# Patient Record
(demographics unavailable — no encounter records)

---

## 2025-07-01 NOTE — HISTORY OF PRESENT ILLNESS
[FreeTextEntry1] : Mac Burkett M.D. Sports and Spine Medicine Department of Physical Medicine and Rehabilitation [facility] [address] [facility], 11th [place] [address]   [facility] [address] [place] [address], 4th [place] [address]   For [place] Appointments Phone: [phone] Fax: [phone]   ----------------------------------------------------------------------------------------------------------------------------------------   PATIENT: AMILCAR OLIVIA MRN: [id number] YOB: 1973 DATE OF SERVICE: 07/01/2025 Jul 01, 2025    Dear Drs.   Thank you for referring AMILCAR OLIVIA to my Sports and Spine practice and office. Enclosed is a copy of the patient's consultation/progress note, which includes my complete assessment and recent studies completed during the patient's evaluation. If you have questions or have any patients who require nonsurgical, non-opiate management of any sports, spine, or musculoskeletal conditions, please do not hesitate to contact my  at [phone]. I look forward to taking care of your patients along with you.   Sincerely,   Mac Burkett MD Sports, Spine, & Regenerative Musculoskeletal Medicine [facility]                                                     Initial Consultation:   CC: radiating arm pain   HPI:  This is the first visit to [facility].     AMILCAR OLIVIA presents with the chief complaint as above.   Initial Hx on 07/01/2025 : Presents in person to [facility] The patients difficulties began one month ago while doing the dishes, lifting a cast iron pan, internally rotated and abduction movements immediately unable to range the left non dominant shoulder/arm not significantly improved over the past month, marked difficulties with upper and lower body dressing, toileting and bathing is quite difficult, relies on her right  started treatment 6/4/2025 with Urgent Care, started on cyclobenzaprine 10mg, prednisone pack, and naprosyn PRN has not started the prednisone; has been taking cyclobenzaprine and NSAID 1-2 times per day patient went to PCP 6/2/2025, started on conservative treatments including HEP; condition worsened so she went to Urgent Care The pain is graded as 10/10 left anterior shoulder pain into crossing the elbow, into the wrist The pain is described as sharp The pain is constantly  The pain radiates in the LEFT UPPER limbs in a C5, C6 distribution The patient feels that the pain is overall persistent Patient denies other recent fall, MVA, injury, trauma, or accident besides presenting history above   Aggravating: sleeping, worse at night, any movement at the left shoulder Alleviating: rest, activity modification, pharmacologic treatments as per intake and as above    Meds: denies regular PO pain medications Therapy Program: no recent structured targeted therapy program HEP: doing HEP regularly; HEP consists of greater than four weeks, 5-6 days per week, 10-15 minutes per session Injection Hx: denies locally directed treatment to the area in question Imaging Hx: reviewed    Assoc Sx: Reports intermittent numbness, tingling paresthesia in the UPPER***LOWER limbs in a *** distribution Otherwise denies numbness, Tingling Denies Focal motor weakness in the upper or lower limbs Denies New or worsened bowel or bladder incontinence Denies Saddle anesthesia Denies Using Orthotic(s)/Supportive devices Denies Swelling in the upper/lower extremities They also deny frequent tripping, falling   ROS: A 14 point review of systems was completed. Positive findings are pain as described above. The remaining systems negative.   Breast Cancer Surveillance: up to date COVID HX: reviewed    Assoc Hx: Ambulates without assistive device Level of functioning: indep with ambulation, indep with ADLs Living Situation: dwelling with steps to enter

## 2025-07-01 NOTE — PHYSICAL EXAM
[FreeTextEntry1] : Gen: A+O x 3 in NAD Psych: Normal mood and affect. Responds appropriately to commands Eyes: Anicteric. No discharge. EOMI. Resp: Breathing unlabored CV: radial pulses 2+ and equal. No varicosities noted Ext: No c/c/e Skin: No lesions noted   Gait: +antalgic +  reciprocating heel to toe able to stand on toes and heels WITH hand holding/holding onto counter with both hands Tandem gait intact WITH hand holding Romberg negative   CN2-12 grossly intact   Inspection: Spine alignment is midline, with no evidence of scoliosis. Iliac crest heights and PSIS heights level.   + Forward head position.   Palpation: There is + tenderness over the upper traps, levator scaps, rhomboids, articular pillars, cervical paraspinals, B/L There is + tenderness middle traps, latissimus dorsi, serratus anterior, B/L   Cervical ROM: Flexion, extension, side-bending, rotation, limited in most planes with pain at terminal ROM Finger to nose bilaterally intact    C5 (Shoulder Abduction)        C5 (Elbow Flex)         C6 (Wrist Ext) Right 5/5                                 5/5                           5/5      Left 2/5                                 2/5                           3/5            C7 (Elbow Ext)            C7 (Wrist Flex)             C8 (Long Finger Flex)          T1 (Finger Abduct)         Right 5/5                     5/5                                      5/5                                       5/5                                                Left 3/5                     2/5                                      2/5                                       2/5                                 C8 (Thumb Ext)            C8 & T1 (Thumb Opp)          Right 5/5                            5/5                                                Left 2/5                            1/5                               Tone: Normal. No cog wheeling. Proprioception at DIPs intact B/L Sensation: Grossly intact to light touch and pinprick bilateral upper extremities. Reflexes: 3+ LEFT biceps, pronators, brachioradialis, triceps Hoffmans Sign LEFT Spurling's Sign negative Shoulder Abduction Test (Bakody's) absent Lhermittes Sign negative   RIGHT SHOULDER: neg effusion neg scars or lacerations or lesions neg increased warmth neg scapular winging neg scapular dyskinesis (Type 2) neg muscle atrophy   Palpation: no TTP over sterna-clavicular joint no TTP over clavicle no TTP over coracoid process no TTP over sternum no TTP over AC joint no TTP over humerus no TTP over the spine of the scapula no TTP over the medial border of the scapula, superior angle, inferior angle, lateral border no TTP over supraspinatus no TTP over infraspinatus no TTP over upper trapezius no TTP over deltoid muscle no TTP in the axilla neg crepitus with PROM shoulder   AROM: Active range of motion full and painless Scapulohumeral rhythm was smooth, appropriate  PROM consistent with above    neg sulcus sign neg Neer test neg Castillo test neg Yocums test neg Speeds test neg Yergasons test neg drop arm test neg empty can test neg Odell's Test neg external rotation lag sign neg lift off sign neg belly press neg hornblowers sign neg Horizontal adduction test   Sensation to light touch intact over all dermatomes about the shoulder   Distal pulses present     LEFT SHOULDER neg effusion neg scars or lacerations or lesions neg increased warmth neg scapular winging neg scapular dyskinesis (Type 2) neg muscle atroph   Palpation: no TTP over sterna-clavicular joint no TTP over clavicle no TTP over coracoid process no TTP over sternum + TTP over AC joint n+o TTP over humerus no TTP over the spine of the scapula no TTP over the medial border of the scapula, superior angle, inferior angle, lateral border +TTP over supraspinatus +TTP over infraspinatus no TTP over upper trapezius + TTP over deltoid muscle no TTP in the axilla neg crepitus with PROM shoulder   AROM: Active range of motion limited in most planes as below Scapulohumeral rhythm abnormal: + slowed, interrupted, aided by compensatory movements contralaterally as below   Abduction 45/170-180 Forward Flexion 35/160-180 Elevation through the plane of the scapula 25/170-180 External Rotation 40/80-90 Internal Rotation 20/ Extension 5/50-60 Adduction 15/50-75   PROM consistent with above   neg sulcus sign neg Neer test neg Castillo test neg Yocums test neg Speeds test neg Yergasons test +drop arm test +empty can test neg Odell's Test neg external rotation lag sign neg lift off sign neg belly press neg hornblowers sign neg Horizontal adduction test   Sensation to light touch intact over all dermatomes about the shoulder Distal pulses present   Manual Muscle Testing   Resisted Internal Rotation Left 2/5   Resisted External Rotation  Left 2/5

## 2025-07-01 NOTE — ASSESSMENT
[FreeTextEntry1] :                                                       Assessment/Plan:   AMILCAR OLIVIA is a 51 year female with radicular left upper limb pain here for initial consultation. - Tiers of treatment and management of above diagnosis(es) were discussed with patient - Optimal diet, weight, sleep, and lifestyle management to minimize stress and maximize well being counseling provided - Imaging reviewed and discussed with patient - Patient was advised to HOLD OFF a structured, targeted therapy program 2-3x/wk for 8 wks with goal toward HEP - Patient was advised to start gabapentin for their neuropathic pain symptoms. Patient was instructed to begin with 100mg at bedtime for the next week. If well tolerated, patient will double their nightly dose to 200mg at bedtime. . Patient provided with written instructions as well. All questions were answered and the patient displayed a clear understanding of the plan of care, including titration of gabapentin. The patient was informed about not taking this medication at the same time as any sleeping or muscle relaxant pills. Pt was also notified to stop, and contact our office, if it is too sedating, ankle swelling occurs and/or they experience suicidal thinking. - Patient was advised to apply cool compresses or warm heat to affected regions PRN - Radiographs of cervical spine and left shoulder ordered 07/01/2025   - Jul 01, 2025 Patient was prescribed medrol dose pack (x1) with written instructions, all questions answered, informed of side effects of the medication.  Possible side effects, including hyperglycemia, GI upset, and GI bleed, reviewed with patient. In agreement with patient that potential pain reduction and anti-inflammatory benefits currently outweigh known side effect profile for oral corticosteroids. Patient instructed to immediately stop medication should she develop any abdominal pain, nausea, vomiting, bloody stools, or BRBPR   - Educated about red flag symptoms including (but not limited to) new, worsened, or persistent: fever greater than 100F, bowel or bladder incontinence, bowel obstipation, inability to void urine, urinary leakage, Severe nausea or vomiting, Worsening numbness, worsening tingling/paresthesias, and/or new or progressive motor weakness; advised to seek immediate medical attention at his nearest Emergency department should they experience any of the above   - 07/01/2025: stat MRI left shoulder without contrast is indicated given that the pt has not improved with tylenol, ibuprofen, naproxen, meloxicam, they obtained non-diagnostic radiographs Jul 01, 2025 marked left shoulder ROM limitation in all planes , clinical exam suggests labral derangement, xr from Jul 01, 2025 with marked superior position of the humeral head and moderate GH OA, r/o subluxation  and physical therapy/home exercise program>6 weeks. Patient's imaging is medically necessary to outline targets for locally (interventional) directed treatments and/or guide surgical management.  - 07/01/2025 MRI cervical spine without contrast is indicated given that the pt has not improved with tylenol, ibuprofen, naproxen, meloxicam, they underwent non-diagnostic radiographic imaging of the region Jul 01, 2025, 6/1/2025 onset radicular let non dominant upper limb pain marked functional decline since then despite PCP and Urgent Care treatments including medications, non dx XR Jul 01, 2025, on exam with global left hand weakness and brisk reflexes BUE, and physical therapy/home exercise program>6 weeks. Patient's imaging is medically necessary to outline targets for locally (interventional) directed treatments and/or guide surgical management.    - Follow up in 2-3 weeks after imaging, in person in 2 months to assess their progress   I have personally spent a total of at least 60 minutes preparing, reviewing internal and external records, explaining, counseling, providing necessary information via documented paperwork for this encounter, and coordinating care for this patient encounter.    Thank you, Dr(s), for allowing me to participate in the care of your patient. Please do not hesitate to contact me with questions/concerns.   Mac Burkett M.D. Sports and Spine Department of Physical Medicine and Rehabilitation [facility] [address], [address] [place] [address]   Appointments: [phone] Fax: [phone]

## 2025-07-01 NOTE — DATA REVIEWED
[FreeTextEntry1] : 07/01/2025 Cervical Spine XR [6 views, AP, Lateral, Left/right Obliques, Flex, Ext]: Indication: Pain  Technique: 6 view/s of the Cervical spine. Findings: The Cervical vertebrae are visualized from C1-C7. No acute fracture is identified. The vertebral body and disc space heights are generally maintained. Multilevel facet degeneration noted at C3/4, C4/5 which also demonstrate prominent anterior osteophyte formation. The vertebral bodies maintain normal alignment without spondylolisthesis including during Flexion-Extension views. left sided lateral bridging, osteophytes.  The Odontoid process is intact. There is no pre-vertebral soft tissue swelling. There is loss of cervical lordosis. Impression: MIld cervical DDD   07/01/2025 X-rays of the left (AP, ER, IR, Scapular-Y View) demonstrate:  + joint space narrowing Type 2 acromion neg Fracture or dislocation There IS superior migration of the humeral head There is narrowing within the SASD space with both ER and IR  There is + humeral head pseudocystic appearance (possible repetitive stress from tendinopathic rotator cuff) There is mild degenerative disease of the Acromio-clavicular joint  marked superior position of the humeral head and moderate GH OA, r/o subluxation

## 2025-07-24 NOTE — PHYSICAL EXAM
[FreeTextEntry1] : Gen: A+O x 3 in NAD Psych: Normal mood and affect. Responds appropriately to commands Eyes: Anicteric. No discharge. EOMI. Resp: Breathing unlabored CV: radial pulses 2+ and equal. No varicosities noted Ext: No c/c/e Skin: No lesions noted   Gait: +antalgic +  reciprocating heel to toe able to stand on toes and heels WITH hand holding/holding onto counter with both hands Tandem gait intact WITH hand holding Romberg negative   CN2-12 grossly intact   Inspection: Spine alignment is midline, with no evidence of scoliosis. Iliac crest heights and PSIS heights level.   + Forward head position.   Palpation: There is + tenderness over the upper traps, levator scaps, rhomboids, articular pillars, cervical paraspinals, B/L There is + tenderness middle traps, latissimus dorsi, serratus anterior, B/L   Cervical ROM: Flexion, extension, side-bending, rotation, limited in most planes with pain at terminal ROM Finger to nose bilaterally intact    C5 (Shoulder Abduction)        C5 (Elbow Flex)         C6 (Wrist Ext) Right 5/5                                 5/5                           5/5      Left 2/5                                 2/5                           3/5            C7 (Elbow Ext)            C7 (Wrist Flex)             C8 (Long Finger Flex)          T1 (Finger Abduct)         Right 5/5                     5/5                                      5/5                                       5/5                                                Left 3/5                     2/5                                      2/5                                       2/5                                 C8 (Thumb Ext)            C8 & T1 (Thumb Opp)          Right 5/5                            5/5                                                Left 2/5                            1/5                               Tone: Normal. No cog wheeling. Proprioception at DIPs intact B/L Sensation: Grossly intact to light touch and pinprick bilateral upper extremities. Reflexes: 3+ LEFT biceps, pronators, brachioradialis, triceps Hoffmans Sign LEFT Spurling's Sign negative Shoulder Abduction Test (Bakody's) absent Lhermittes Sign negative   RIGHT SHOULDER: neg effusion neg scars or lacerations or lesions neg increased warmth neg scapular winging neg scapular dyskinesis (Type 2) neg muscle atrophy   Palpation: no TTP over sterna-clavicular joint no TTP over clavicle no TTP over coracoid process no TTP over sternum no TTP over AC joint no TTP over humerus no TTP over the spine of the scapula no TTP over the medial border of the scapula, superior angle, inferior angle, lateral border no TTP over supraspinatus no TTP over infraspinatus no TTP over upper trapezius no TTP over deltoid muscle no TTP in the axilla neg crepitus with PROM shoulder   AROM: Active range of motion full and painless Scapulohumeral rhythm was smooth, appropriate  PROM consistent with above    neg sulcus sign neg Neer test neg Castillo test neg Yocums test neg Speeds test neg Yergasons test neg drop arm test neg empty can test neg [place]'s Test neg external rotation lag sign neg lift off sign neg belly press neg hornblowers sign neg Horizontal adduction test   Sensation to light touch intact over all dermatomes about the shoulder   Distal pulses present     LEFT SHOULDER neg effusion neg scars or lacerations or lesions neg increased warmth neg scapular winging neg scapular dyskinesis (Type 2) neg muscle atroph   Palpation: no TTP over sterna-clavicular joint no TTP over clavicle no TTP over coracoid process no TTP over sternum + TTP over AC joint n+o TTP over humerus no TTP over the spine of the scapula no TTP over the medial border of the scapula, superior angle, inferior angle, lateral border +TTP over supraspinatus +TTP over infraspinatus no TTP over upper trapezius + TTP over deltoid muscle no TTP in the axilla neg crepitus with PROM shoulder   AROM: Active range of motion limited in most planes as below Scapulohumeral rhythm abnormal: + slowed, interrupted, aided by compensatory movements contralaterally as below   Abduction 45/170-180 Forward Flexion 35/160-180 Elevation through the plane of the scapula 25/170-180 External Rotation 40/80-90 Internal Rotation 20/ Extension 5/50-60 Adduction 15/50-75   PROM consistent with above   neg sulcus sign neg Neer test neg Castillo test neg Yocums test neg Speeds test neg Yergasons test +drop arm test +empty can test neg [place]'s Test neg external rotation lag sign neg lift off sign neg belly press neg hornblowers sign neg Horizontal adduction test   Sensation to light touch intact over all dermatomes about the shoulder Distal pulses present   Manual Muscle Testing   Resisted Internal Rotation Left 2/5   Resisted External Rotation  Left 2/5

## 2025-07-24 NOTE — ASSESSMENT
[FreeTextEntry1] :                                                       Assessment/Plan:   AMILCAR OLIVIA is a 51 year female with radicular left upper limb pain here for follow up - Tiers of treatment and management of above diagnosis(es) were discussed with patient - Optimal diet, weight, sleep, and lifestyle management to minimize stress and maximize well being counseling provided - Imaging reviewed and discussed with patient - Patient was advised to HOLD OFF a structured, targeted therapy program 2-3x/wk for 8 wks with goal toward HEP - Patient was advised to start gabapentin for their neuropathic pain symptoms. Patient was instructed to begin with 100mg at bedtime for the next week. If well tolerated, patient will double their nightly dose to 200mg at bedtime. . Patient provided with written instructions as well. All questions were answered and the patient displayed a clear understanding of the plan of care, including titration of gabapentin. The patient was informed about not taking this medication at the same time as any sleeping or muscle relaxant pills. Pt was also notified to stop, and contact our office, if it is too sedating, ankle swelling occurs and/or they experience suicidal thinking. - Patient was advised to apply cool compresses or warm heat to affected regions PRN - Radiographs of cervical spine and left shoulder ordered 07/01/2025   - Jul 01, 2025 Patient was prescribed medrol dose pack (x1) with written instructions, all questions answered, informed of side effects of the medication.  Possible side effects, including hyperglycemia, GI upset, and GI bleed, reviewed with patient. In agreement with patient that potential pain reduction and anti-inflammatory benefits currently outweigh known side effect profile for oral corticosteroids. Patient instructed to immediately stop medication should she develop any abdominal pain, nausea, vomiting, bloody stools, or BRBPR   - Educated about red flag symptoms including (but not limited to) new, worsened, or persistent: fever greater than 100F, bowel or bladder incontinence, bowel obstipation, inability to void urine, urinary leakage, Severe nausea or vomiting, Worsening numbness, worsening tingling/paresthesias, and/or new or progressive motor weakness; advised to seek immediate medical attention at his nearest Emergency department should they experience any of the above   - 07/01/2025: stat MRI left shoulder without contrast is indicated given that the pt has not improved with tylenol, ibuprofen, naproxen, meloxicam, they obtained non-diagnostic radiographs Jul 01, 2025 marked left shoulder ROM limitation in all planes , clinical exam suggests labral derangement, xr from Jul 01, 2025 with marked superior position of the humeral head and moderate GH OA, r/o subluxation  and physical therapy/home exercise program>6 weeks. Patient's imaging is medically necessary to outline targets for locally (interventional) directed treatments and/or guide surgical management.  - 07/01/2025 MRI cervical spine without contrast is indicated given that the pt has not improved with tylenol, ibuprofen, naproxen, meloxicam, they underwent non-diagnostic radiographic imaging of the region Jul 01, 2025, 6/1/2025 onset radicular let non dominant upper limb pain marked functional decline since then despite PCP and Urgent Care treatments including medications, non dx XR Jul 01, 2025, on exam with global left hand weakness and brisk reflexes BUE, and physical therapy/home exercise program>6 weeks. Patient's imaging is medically necessary to outline targets for locally (interventional) directed treatments and/or guide surgical management.    Jul 24, 2025 advised stat upper limb duplex Jul 24, 2025 advised IR guided left GHJ arthrocentesis - Jul 24, 2025 reduce gabapentin to 100mg at bedtime  - Follow up in 2-3 weeks after imaging in person    I have personally spent a total of at least 35 minutes preparing, reviewing internal and external records, explaining, counseling, providing necessary information via documented paperwork for this encounter, and coordinating care for this patient encounter.    Thank you, (s), for allowing me to participate in the care of your patient. Please do not hesitate to contact me with questions/concerns.   Mac Burkett M.D. Sports and Spine Department of Physical Medicine and Rehabilitation [facility] [place] [facility] [address] [place] [address]   Appointments: [phone] Fax: [phone]       adv

## 2025-07-24 NOTE — HISTORY OF PRESENT ILLNESS
[FreeTextEntry1] : Mac Burkett M.D. Sports and Spine Medicine Department of Physical Medicine and Rehabilitation [facility] Orthopa[facility] [address] [place] [address]   [facility] [place] [facility] [address] [place] [address]   For [place] Appointments Phone: [phone] Fax: [phone]   ----------------------------------------------------------------------------------------------------------------------------------------   PATIENT: AMILCAR OLIVIA MRN: [id number] YOB: 1973 DATE OF SERVICE Jul 24, 2025 Jul 24, 2025  Dear Drs.   Thank you for referring AMILCAR OLIVIA to my Sports and Spine practice and office. Enclosed is a copy of the patient's consultation/progress note, which includes my complete assessment and recent studies completed during the patient's evaluation. If you have questions or have any patients who require nonsurgical, non-opiate management of any sports, spine, or musculoskeletal conditions, please do not hesitate to contact my  at [phone]. I look forward to taking care of your patients along with you.   Sincerely,   Mac Burkett MD Sports, Spine, & Regenerative Musculoskeletal Medicine Orthopaedic [facility]                                                     Follow Up Visit   CC: radiating arm pain   HPI:  This a follow up visit to [facility] [place] [facility].     AMILCAR OLIVIA presents with the chief complaint as above.  Interval Hx on Jul 24, 2025: presents for follow up. At the last visit, we advised holding off on PT, oral steroid pack, start gabapentin, methocarbamol as needed, advised MRi cervical and MRI left shoulder, and suggested follow up in 2 weeks. key clinical question with MRi cervical had bee: onset radicular left non dominant upper limb pain marked functional decline since then despite PCP and Urgent Care treatments including medications, non dx XR Jul 01, 2025, on exam with global left hand weakness and brisk reflexes BUE; INFORMED Patient informed of all relevant imaging findings, all questions were answered including C5/6 left sided disc bulge. key clinical question with MR shoulder left had been marked left shoulder ROM limitation in all planes, clinical exam suggests labral derangement, xr from Jul 01, 2025 with marked superior position of the humeral head and moderate GH OA, r/o subluxation. Since the last visit, patient has completed oral steroid. has been taking gabapentin and naprosyn. Since the last visit, they relate significant improvements in pain previously associated with known exacerbating, aggravating factors and situations. Pharmacologic treatments now include OTC analgesics PRN, but otherwise pharmacologic treatments are minimal. Denies new or worsened numbness, tingling, or focal motor deficit. Denies interval fall, accident, or injury. she mentions some fatigue during steroid pack and trouble sleeping, "more hot than usual" "sweating more than usual." she continues to take. in the mirror, lately since the last visit for the last week or two, more pronounced left upper limb swelling   Initial Hx on 07/01/2025: Presents in person to [facility]. The patients difficulties began one month ago while doing the dishes, lifting a cast iron pan, internally rotated and abduction movements. immediately unable to range the left non dominant shoulder/arm. not significantly improved over the past month, marked difficulties with upper and lower body dressing, toileting and bathing is quite difficult, relies on her right . started treatment 6/4/2025 with Urgent Care, started on cyclobenzaprine 10mg, prednisone pack, and naprosyn PRN. has not started the prednisone; has been taking cyclobenzaprine and NSAID 1-2 times per day. patient went to PCP 6/2/2025, started on conservative treatments including HEP; condition worsened so she went to Urgent Care. The pain is graded as 10/10. left anterior shoulder pain into crossing the elbow, into the wrist. The pain is described as sharp. The pain is constantly. The pain radiates in the LEFT UPPER limbs in a C5, C6 distribution. The patient feels that the pain is overall persistent. Patient denies other recent fall, MVA, injury, trauma, or accident besides presenting history above. Aggravating: sleeping, worse at night, any movement at the left shoulder. Alleviating: rest, activity modification, pharmacologic treatments as per intake and as above    Meds: denies regular PO pain medications Therapy Program: no recent structured targeted therapy program HEP: doing HEP regularly; HEP consists of greater than four weeks, 5-6 days per week, 10-15 minutes per session Injection Hx: denies locally directed treatment to the area in question Imaging Hx: reviewed    Assoc Sx: denies numbness, Tingling Denies Focal motor weakness in the upper or lower limbs Denies New or worsened bowel or bladder incontinence Denies Saddle anesthesia Denies Using Orthotic(s)/Supportive devices Denies Swelling in the upper/lower extremities They also deny frequent tripping, falling   ROS: A 14 point review of systems was completed. Positive findings are pain as described above. The remaining systems negative.   Breast Cancer Surveillance: up to date COVID HX: reviewed    Assoc Hx: Ambulates without assistive device Level of functioning: indep with ambulation, indep with ADLs Living Situation: dwelling with steps to enter